# Patient Record
Sex: MALE | Race: BLACK OR AFRICAN AMERICAN | NOT HISPANIC OR LATINO
[De-identification: names, ages, dates, MRNs, and addresses within clinical notes are randomized per-mention and may not be internally consistent; named-entity substitution may affect disease eponyms.]

---
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
## 2023-01-12 ENCOUNTER — APPOINTMENT (OUTPATIENT)
Dept: ORTHOPEDIC SURGERY | Facility: CLINIC | Age: 18
End: 2023-01-12

## 2023-02-09 ENCOUNTER — NON-APPOINTMENT (OUTPATIENT)
Age: 18
End: 2023-02-09

## 2023-02-24 ENCOUNTER — APPOINTMENT (OUTPATIENT)
Dept: ORTHOPEDIC SURGERY | Facility: CLINIC | Age: 18
End: 2023-02-24

## 2023-06-01 NOTE — H&P PST ADULT - PULMONARY EMBOLUS
Outpatient Physical Therapy  DAILY TREATMENT     [facility] [place]  [address] [place] [address], [address]  [facility] [address]  Phone:  [phone]  Fax:  [phone]    Date: 06/01/2023    Patient: Edgar Viramontes  YOB: 2006  MRN: [id number]     Time Calculation    Start time: 1540  Stop time: 1620 Time Calculation (min): 40 minutes         Chief Complaint: Difficulty Walking and Back Problem    Visit #: 3    SUBJECTIVE:  Patient reports that he is doing well today. Notes no soreness.     OBJECTIVE:  Current objective measures:           Therapeutic Exercises (CPT 49766):     1. Nu step, x10min    2. Seated hip abd, x20 or fatigue, HEP    3. ankle rockers, x fatigue    4. multifidi push, x30    5. clam shells, no resistance, 2 x 10    6. sit to stand, x15, HEP    7. seated hip abd, x30 or fatigue    8. seated pull downs green tb, 35x, pt unable to do standing pull downs bc of back pain    9. bridge, x10, HEP      Time-based treatments/modalities:    Physical Therapy Timed Treatment Charges  Therapeutic exercise minutes (CPT 54585): 40 minutes      Pain rating (1-10) before treatment:  1  Pain rating (1-10) after treatment:  1    ASSESSMENT:   Response to treatment: Patient responded fair to therapy with an increase in fatigue but no reported increase in pain. He notes he is not currently doing his HEP. Set HEP to 3 exercises then plan to increase.     PLAN/RECOMMENDATIONS:   Plan for treatment: therapy treatment to continue next visit.  Planned interventions for next visit: continue with current treatment.          no

## 2024-04-25 ENCOUNTER — APPOINTMENT (OUTPATIENT)
Dept: ORTHOPEDIC SURGERY | Facility: CLINIC | Age: 19
End: 2024-04-25

## 2024-11-12 NOTE — H&P PST ADULT - ANESTHESIA, PREVIOUS REACTION, PROFILE
Clinic Care Coordination Contact  [facility]/Voicemail    Clinical Data: Care Coordinator Outreach    Outreach Documentation Number of Outreach Attempt   11/6/2024   5:50 PM 1   11/7/2024   9:17 AM 2   11/12/2024  12:44 PM 3       Left message on patient's voicemail with call back information and requested return call.      Plan: Care Coordinator will do no further outreaches at this time.    DEVON Shields, MSW   [facility]  Care Coordination  [facility]  [phone]  11/12/2024 12:44 PM      
Clinic Care Coordination Contact  [facility]/Voicemail    Clinical Data: Care Coordinator Outreach    Outreach Documentation Number of Outreach Attempt   11/6/2024   5:50 PM 1   11/7/2024   9:17 AM 2       Left message on patient's voicemail with call back information and requested return call.      Plan: Care Coordinator will try to reach patient again in 3-5 business days.    DEVON Shields, MSW   [facility]  Care Coordination  [facility]  [phone]  11/7/2024 9:17 AM      
Clinic Care Coordination Contact  [facility]/Voicemail    Clinical Data: Care Coordinator Outreach  {Link to outreach attempt documentation:[id number]  Outreach Documentation Number of Outreach Attempt   11/6/2024   5:50 PM 1       Left message on patient's voicemail with call back information and requested return call.      Plan: Care Coordinator sent care coordination introduction letter via mail. Care Coordinator will try to reach patient again in 1-2 business days.    DEVON Shields, MSW   [facility]  Care Coordination  [facility]  [phone]  11/6/2024 5:50 PM      
pt denies h/o anesthesia, denies fh anesthesia complications

## (undated) DEVICE — SAW BLADE MICROAIRE SAGITTAL 9.4MMX25.4MMX0.6MM

## (undated) DEVICE — SHAVER BLADE S&N SYNOVATOR 4.5MM STRAIGHT (FOREST GREEN)

## (undated) DEVICE — SUT VICRYL 2-0 27" FS-1 UNDYED

## (undated) DEVICE — POSITIONER STRAP ARMBOARD VELCRO TS-30

## (undated) DEVICE — DEPUY ACL GRAFT KNIFE 9MM

## (undated) DEVICE — VENODYNE/SCD SLEEVE CALF MEDIUM

## (undated) DEVICE — DEPUY ACL GRAFT KNIFE 10MM

## (undated) DEVICE — SOL IRR BAG NS 0.9% 3000ML

## (undated) DEVICE — DRSG STERISTRIPS 0.25 X 3"

## (undated) DEVICE — TOURNIQUET CUFF 34" DUAL PORT W PLC

## (undated) DEVICE — ARTHREX KIT ACL TRANSTIBIAL WITHOUT SAW BLADE

## (undated) DEVICE — SUT NYLON 3-0 18" PS-2

## (undated) DEVICE — GLV 8 PROTEXIS (CREAM) NEU-THERA

## (undated) DEVICE — TOURNIQUET CUFF 24" DUAL PORT SINGLE BLADDER LUER LOCK  (BLACK)

## (undated) DEVICE — TUBING DEPUY MITEK FMS INFLOW

## (undated) DEVICE — SYR LUER LOK 50CC

## (undated) DEVICE — PACK KNEE ARTHROSCOPY

## (undated) DEVICE — SUT WIRE # 2 TIGERLOOP

## (undated) DEVICE — PLUG BONE CANN 7-12MM

## (undated) DEVICE — SUT MONOCRYL 4-0 18" PS-2

## (undated) DEVICE — TUBING DEPUY MITEK FMS OUTFLOW

## (undated) DEVICE — SUT FIBERWIRE LOOP 2 STRAIGHT NDL 15"

## (undated) DEVICE — CANNULA S&N CLEAR-TRAC SCREW 7MM

## (undated) DEVICE — SUT ETHIBOND 5 4-30" CCS

## (undated) DEVICE — DRSG MASTISOL

## (undated) DEVICE — BLADE SURGICAL #15 CARBON

## (undated) DEVICE — SUT VICRYL 0 18" OS-6 UNDYED (POP-OFF)

## (undated) DEVICE — WARMING BLANKET UPPER ADULT